# Patient Record
Sex: FEMALE | Race: WHITE | Employment: STUDENT
[De-identification: names, ages, dates, MRNs, and addresses within clinical notes are randomized per-mention and may not be internally consistent; named-entity substitution may affect disease eponyms.]

---

## 2018-04-18 ENCOUNTER — RECORDS - HEALTHEAST (OUTPATIENT)
Dept: ADMINISTRATIVE | Facility: OTHER | Age: 23
End: 2018-04-18

## 2018-04-18 LAB — PAP SMEAR - HIM PATIENT REPORTED: NORMAL

## 2020-03-11 ENCOUNTER — OFFICE VISIT - HEALTHEAST (OUTPATIENT)
Dept: FAMILY MEDICINE | Facility: CLINIC | Age: 25
End: 2020-03-11

## 2020-03-11 ENCOUNTER — COMMUNICATION - HEALTHEAST (OUTPATIENT)
Dept: FAMILY MEDICINE | Facility: CLINIC | Age: 25
End: 2020-03-11

## 2020-03-11 DIAGNOSIS — R00.0 TACHYCARDIA: ICD-10-CM

## 2020-03-11 DIAGNOSIS — Z23 IMMUNIZATION DUE: ICD-10-CM

## 2020-03-11 DIAGNOSIS — Z00.00 ANNUAL PHYSICAL EXAM: ICD-10-CM

## 2020-03-11 LAB
ANION GAP SERPL CALCULATED.3IONS-SCNC: 9 MMOL/L (ref 5–18)
ATRIAL RATE - MUSE: 67 BPM
BUN SERPL-MCNC: 11 MG/DL (ref 8–22)
CALCIUM SERPL-MCNC: 9.3 MG/DL (ref 8.5–10.5)
CHLORIDE BLD-SCNC: 105 MMOL/L (ref 98–107)
CO2 SERPL-SCNC: 25 MMOL/L (ref 22–31)
CREAT SERPL-MCNC: 0.73 MG/DL (ref 0.6–1.1)
DIASTOLIC BLOOD PRESSURE - MUSE: NORMAL
GFR SERPL CREATININE-BSD FRML MDRD: >60 ML/MIN/1.73M2
GLUCOSE BLD-MCNC: 81 MG/DL (ref 70–125)
INTERPRETATION ECG - MUSE: NORMAL
MAGNESIUM SERPL-MCNC: 1.8 MG/DL (ref 1.8–2.6)
P AXIS - MUSE: 55 DEGREES
POTASSIUM BLD-SCNC: 4.2 MMOL/L (ref 3.5–5)
PR INTERVAL - MUSE: 142 MS
QRS DURATION - MUSE: 80 MS
QT - MUSE: 394 MS
QTC - MUSE: 416 MS
R AXIS - MUSE: 85 DEGREES
SODIUM SERPL-SCNC: 139 MMOL/L (ref 136–145)
SYSTOLIC BLOOD PRESSURE - MUSE: NORMAL
T AXIS - MUSE: 58 DEGREES
TSH SERPL DL<=0.005 MIU/L-ACNC: 1.33 UIU/ML (ref 0.3–5)
VENTRICULAR RATE- MUSE: 67 BPM

## 2020-03-11 RX ORDER — LEVONORGESTREL AND ETHINYL ESTRADIOL 0.15-0.03
KIT ORAL
Status: SHIPPED | COMMUNITY
Start: 2019-12-12

## 2020-03-11 ASSESSMENT — MIFFLIN-ST. JEOR: SCORE: 1331.01

## 2020-03-18 ENCOUNTER — RECORDS - HEALTHEAST (OUTPATIENT)
Dept: ADMINISTRATIVE | Facility: OTHER | Age: 25
End: 2020-03-18

## 2020-03-19 ENCOUNTER — RECORDS - HEALTHEAST (OUTPATIENT)
Dept: HEALTH INFORMATION MANAGEMENT | Facility: CLINIC | Age: 25
End: 2020-03-19

## 2021-06-04 VITALS
TEMPERATURE: 99.2 F | SYSTOLIC BLOOD PRESSURE: 108 MMHG | HEART RATE: 80 BPM | DIASTOLIC BLOOD PRESSURE: 72 MMHG | RESPIRATION RATE: 12 BRPM | WEIGHT: 128.13 LBS | BODY MASS INDEX: 21.35 KG/M2 | HEIGHT: 65 IN

## 2021-06-06 NOTE — PROGRESS NOTES
[facility] Note    Name: Remedios Weeks  : 1995   MRN: [id number]    Remedios Weeks is a 24 y.o. female presenting to discuss the following:     CC:   Chief Complaint   Patient presents with     Annual Exam     Fasting for labs.     Tachycardia     Blurry vision and couldnt hear       HPI:  PHYSICAL:   [facility] and is [facility] to date [facility] pap smear. States was normal in 2018. No history of abnormal pap. She denies any breast changes or concerns. Periods are regular on OCPs, no breakthrough bleeding or concerns.     TACHYCARDIC EPISODE:   2 weeks ago, started to feel bad. 2 nights in a row last night felt like heart was beating fast, could feel on chest. Felt very dizzy during episodes. Felt hot and flushed, sweaty, was short of breath, vision went dark. Symptoms lasted for a few minutes and then everything recovered.     Denies any stress or anxiety. Denies any caffeine intake or substances.   Has family history of heart problems. Grandparents and uncle all passed away from heart attack.   5 years ago, would have palpitation symptoms twice weekly. Went to doctor in [facility], didn't find a diagnosis.     Notes hard to fall asleep and waking up a few times a night. Is fatigued during day. Going on for the last few months. Happening more often than not.     Also has noticed some changes in bowel movements. Had diarrhea related to episodes above, has since resolved.     HEALTH MAINTENANCE:   - [facility]  - flu shot: will do flu shot   - TDaP: did have one in the last 10 years, needed for college   - HPV: will do     ROS:   10 point ROS negative except for as reported above.    PMH:   There is no problem list on file for this patient.      No past medical history on file.    PSH:   No past surgical history on file.      MEDICATIONS:   Current Outpatient Medications on File Prior to Visit  "  Medication Sig Dispense Refill     CHATEAL EQ, 28, 0.15-0.03 mg per tablet        No current facility-administered medications on file prior to visit.        ALLERGIES:  No Known Allergies    FAMHx:  Family History   Problem Relation Age of Onset     Breast cancer Paternal Aunt 44     No Medical Problems Mother      No Medical Problems Father      No Medical Problems Sister      No Medical Problems Brother      Heart disease Other         grandfather       SOCIAL HISTORY:   Social History     Tobacco Use     Smoking status: Never Smoker     Smokeless tobacco: Never Used   Substance Use Topics     Alcohol use: Never     Frequency: Never     Drug use: Never     Works as a nanny. Currently attending college at [employer]. From [facility], came to work as an .    PHYSICAL EXAM:   /72   Pulse 80   Temp 99.2  F (37.3  C) (Oral)   Resp 12   Ht 5' 5.25\" (1.657 m)   Wt 128 lb 2 oz (58.1 kg)   LMP 03/01/2020 (Exact Date)   BMI 21.16 kg/m     GENERAL: Chaya is a pleasant, well appearing female in acute distress.   HEENT: Sclera white, no nasal discharge, oropharynx pink and moist, no cervical lymphadenopathy, no thyromegaly.   HEART: Regular rate and rhythm, no murmurs.   LUNGS: Clear to auscultation bilaterally, unlabored.   ABDOMEN: Soft, non-tender to palpation, no palpable masses.   MSK: No deformities or effusions.   NEURO: No gross deficits.   PSYCH: Mood is good, normal affect.   DERM: Mild acne on face.     EKG: Rate 67 bpm, normal sinus rhythm, normal axis, normal intervals. No pathologic Q waves, no delta waves, no ST changes, no T wave inversions. Normal EKG.    LABS:   Recent Results (from the past 24 hour(s))   Electrocardiogram Perform and Read   Result Value Ref Range    SYSTOLIC BLOOD PRESSURE      DIASTOLIC BLOOD PRESSURE      VENTRICULAR RATE 67 BPM    ATRIAL RATE 67 BPM    P-R INTERVAL 142 ms    QRS DURATION 80 ms    Q-T INTERVAL 394 ms    QTC CALCULATION (BEZET) 416 ms    P Axis 55 " degrees    R AXIS 85 degrees    T AXIS 58 degrees    MUSE DIAGNOSIS       Normal sinus rhythm  Normal ECG  No previous ECGs available  Confirmed by THELMA KAUFFMAN MD LOC: ([id number]) on 3/11/2020 2:34:15 PM     Basic Metabolic Panel   Result Value Ref Range    Sodium 139 136 - 145 mmol/L    Potassium 4.2 3.5 - 5.0 mmol/L    Chloride 105 98 - 107 mmol/L    CO2 25 22 - 31 mmol/L    Anion Gap, Calculation 9 5 - 18 mmol/L    Glucose 81 70 - 125 mg/dL    Calcium 9.3 8.5 - 10.5 mg/dL    BUN 11 8 - 22 mg/dL    Creatinine 0.73 0.60 - 1.10 mg/dL    GFR MDRD Af Amer >60 >60 mL/min/1.73m2    GFR MDRD Non Af Amer >60 >60 mL/min/1.73m2   Magnesium   Result Value Ref Range    Magnesium 1.8 1.8 - 2.6 mg/dL   Thyroid Cascade   Result Value Ref Range    TSH 1.33 0.30 - 5.00 uIU/mL        ASSESSMENT & PLAN:   Remedios eWeks is a 24 y.o. female presenting today for annual physical exam and evaluation of tachycardic episodes.     1. Annual physical exam  Reviewed health history and health maintenance recommendations.  She will check home records for last tetanus booster and let us know when she had this completed.  GREMAINE signed for pap and [facility]I [facility].     2. Tachycardia  - Electrocardiogram Perform and Read  - Basic Metabolic Panel  - Magnesium  - Thyroid Cascade  - ADAIR Monitor Hook-Up; Future    Symptoms are concerning for a tachyarrhythmia. Differential diagnosis includes vasovagal, orthostasis, and anxiety. Will initiate laboratory evaluation and cardiac event monitor to see if we can identify underlying etiology.    3. Immunization due  - HPV vaccine 9 valent 3 dose IM  - Influenza, Seasonal Quad, PF =/> 6months     RTC: 2 months - nurse only repeat HPV vaccination / sooner as needed / TBD - pending work up of tachycardia events / 1 year - annual physical exam    Akilah Roth DO

## 2021-06-06 NOTE — PROGRESS NOTES
BMP, magnesium, and thyroid cascade normal. EKG also confirmed normal by cardiology.  Patient updated by letter in the mail.  Akilah Roth DO

## 2021-06-06 NOTE — PATIENT INSTRUCTIONS - HE
We will be in touch with your results when they return.     the heart monitor. You can call [phone] to schedule to time to .    Check your home records and let us know when your last tetanus shot was.    Schedule a nurse only visit to return in 2 months for second HPV shot.

## 2021-06-20 NOTE — LETTER
Letter by Akilah Roth DO at      Author: Akilah Roth DO Service: -- Author Type: --    Filed:  Encounter Date: 3/11/2020 Status: (Other)         Remedios Weeks  [address]  [place] [address]             March 11, 2020         Dear Ms. Weeks,    Thank you for coming into the clinic. Your labs and EKG (heart rhythm test in clinic) were all normal. We will follow up on heart monitor results. Please call with questions or contact us using [employer].     Resulted Orders   Basic Metabolic Panel   Result Value Ref Range    Sodium 139 136 - 145 mmol/L    Potassium 4.2 3.5 - 5.0 mmol/L    Chloride 105 98 - 107 mmol/L    CO2 25 22 - 31 mmol/L    Anion Gap, Calculation 9 5 - 18 mmol/L    Glucose 81 70 - 125 mg/dL    Calcium 9.3 8.5 - 10.5 mg/dL    BUN 11 8 - 22 mg/dL    Creatinine 0.73 0.60 - 1.10 mg/dL    GFR MDRD Af Amer >60 >60 mL/min/1.73m2    GFR MDRD Non Af Amer >60 >60 mL/min/1.73m2    Narrative    Fasting Glucose reference range is 70-99 mg/dL per  American Diabetes Association (ADA) guidelines.   Magnesium   Result Value Ref Range    Magnesium 1.8 1.8 - 2.6 mg/dL   Thyroid Cascade   Result Value Ref Range    TSH 1.33 0.30 - 5.00 uIU/mL     Sincerely,    Electronically signed by Akilah Roth DO

## 2021-06-30 ENCOUNTER — APPOINTMENT (OUTPATIENT)
Dept: URBAN - METROPOLITAN AREA CLINIC 256 | Age: 26
Setting detail: DERMATOLOGY
End: 2021-06-30

## 2022-02-06 ENCOUNTER — HEALTH MAINTENANCE LETTER (OUTPATIENT)
Age: 27
End: 2022-02-06

## 2022-10-02 ENCOUNTER — HEALTH MAINTENANCE LETTER (OUTPATIENT)
Age: 27
End: 2022-10-02

## 2023-02-11 ENCOUNTER — HEALTH MAINTENANCE LETTER (OUTPATIENT)
Age: 28
End: 2023-02-11

## 2024-03-09 ENCOUNTER — HEALTH MAINTENANCE LETTER (OUTPATIENT)
Age: 29
End: 2024-03-09